# Patient Record
Sex: MALE | Race: AMERICAN INDIAN OR ALASKA NATIVE | URBAN - METROPOLITAN AREA
[De-identification: names, ages, dates, MRNs, and addresses within clinical notes are randomized per-mention and may not be internally consistent; named-entity substitution may affect disease eponyms.]

---

## 2022-12-16 ENCOUNTER — OFFICE VISIT (OUTPATIENT)
Dept: URBAN - METROPOLITAN AREA CLINIC 30 | Facility: CLINIC | Age: 37
End: 2022-12-16
Payer: COMMERCIAL

## 2022-12-16 DIAGNOSIS — H43.813 VITREOUS DEGENERATION, BILATERAL: ICD-10-CM

## 2022-12-16 DIAGNOSIS — H25.012 CORTICAL AGE-RELATED CATARACT, LEFT EYE: Primary | ICD-10-CM

## 2022-12-16 DIAGNOSIS — E11.9 TYPE 2 DIABETES MELLITUS W/O COMPLICATION: ICD-10-CM

## 2022-12-16 PROCEDURE — 92134 CPTRZ OPH DX IMG PST SGM RTA: CPT

## 2022-12-16 PROCEDURE — 99204 OFFICE O/P NEW MOD 45 MIN: CPT

## 2022-12-16 ASSESSMENT — KERATOMETRY
OD: 40.20
OS: 40.35

## 2022-12-16 ASSESSMENT — VISUAL ACUITY
OS: 20/80
OD: 20/20

## 2022-12-16 ASSESSMENT — INTRAOCULAR PRESSURE
OS: 19
OD: 21

## 2022-12-16 NOTE — IMPRESSION/PLAN
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
- Anterior cortical, denies trauma  Plan:  Discussed cataract diagnosis with the patient. Discussed and reviewed treatment options for cataracts. Patient elects surgical treatment. Recommend surgery OS. Patient is candidate/interested in multifocal, toric, standard, LenSx and ORA. Aim OS: plano. Patient advised of implications of lost myopia. Discussed patient will lose ability to read up close OS.

## 2022-12-16 NOTE — IMPRESSION/PLAN
Impression: Type 2 diabetes mellitus w/o complication: Z72.2. Plan: No diabetic retinopathy is noted. Patient is advised that a yearly ophthalmic exam is recommended. Return sooner if any new symptoms.

## 2023-01-12 ENCOUNTER — TESTING ONLY (OUTPATIENT)
Dept: URBAN - METROPOLITAN AREA CLINIC 24 | Facility: CLINIC | Age: 38
End: 2023-01-12
Payer: COMMERCIAL

## 2023-01-12 ENCOUNTER — ADULT PHYSICAL (OUTPATIENT)
Dept: URBAN - METROPOLITAN AREA CLINIC 24 | Facility: CLINIC | Age: 38
End: 2023-01-12

## 2023-01-12 DIAGNOSIS — Z01.818 ENCOUNTER FOR OTHER PREPROCEDURAL EXAMINATION: Primary | ICD-10-CM

## 2023-01-12 DIAGNOSIS — H25.012 CORTICAL AGE-RELATED CATARACT, LEFT EYE: ICD-10-CM

## 2023-01-12 PROCEDURE — 99202 OFFICE O/P NEW SF 15 MIN: CPT | Performed by: PHYSICIAN ASSISTANT

## 2023-01-12 ASSESSMENT — PACHYMETRY
OD: 3.29
OD: 25.78
OS: 25.74
OS: 3.59

## 2023-01-18 ENCOUNTER — PRE-OPERATIVE VISIT (OUTPATIENT)
Dept: URBAN - METROPOLITAN AREA CLINIC 24 | Facility: CLINIC | Age: 38
End: 2023-01-18
Payer: COMMERCIAL

## 2023-01-18 DIAGNOSIS — H43.813 VITREOUS DEGENERATION, BILATERAL: ICD-10-CM

## 2023-01-18 DIAGNOSIS — H52.203 BILATERAL ASTIGMATISM: ICD-10-CM

## 2023-01-18 DIAGNOSIS — H25.012 CORTICAL AGE-RELATED CATARACT, LEFT EYE: Primary | ICD-10-CM

## 2023-01-18 DIAGNOSIS — E11.9 TYPE 2 DIABETES MELLITUS W/O COMPLICATION: ICD-10-CM

## 2023-01-18 PROCEDURE — 99204 OFFICE O/P NEW MOD 45 MIN: CPT | Performed by: OPHTHALMOLOGY

## 2023-01-18 NOTE — IMPRESSION/PLAN
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 Plan: Discussed cataract diagnosis with the patient. Discussed risks, benefits and alternatives to surgery including but not limited to: bleeding, infection, risk of vision loss, loss of the eye, need for other surgery. Patient voiced understanding and wishes to proceed. Patient elects surgical treatment. Advanced technology options Discussed with patient . Patient desires surgery OS first (( AIM -0.25 OU, injectable  1st choice/TRI MOXI, Topical anesthesia, NO ORA, NO LRI-declined AMP, DENSE cortical OS, younger pt)) Patient understands the need for glasses after surgery for BCVA. Understands higher risk of complication and delayed healing due to dense cataract.

## 2023-01-18 NOTE — IMPRESSION/PLAN
Impression: Type 2 diabetes mellitus w/o complication: A71.2. Plan: Undilated exam. Diabetes type lI: no background retinopathy, no signs of neovascularization noted. Discussed ocular and systemic benefits of blood sugar control. Discussed with patient, understands this may limit vision after surgery. Discussed diet, exercise, nutrition. Good blood sugar and blood pressure control. Maintain follow up with PCP. Discussed with patient, understands this may limit vision after surgery.

## 2023-01-30 ENCOUNTER — SURGERY (OUTPATIENT)
Dept: URBAN - METROPOLITAN AREA SURGERY 12 | Facility: SURGERY | Age: 38
End: 2023-01-30
Payer: COMMERCIAL

## 2023-01-30 DIAGNOSIS — H25.012 CORTICAL AGE-RELATED CATARACT, LEFT EYE: Primary | ICD-10-CM

## 2023-01-30 PROCEDURE — 66984 XCAPSL CTRC RMVL W/O ECP: CPT | Performed by: OPHTHALMOLOGY

## 2023-01-31 ENCOUNTER — POST-OPERATIVE VISIT (OUTPATIENT)
Dept: URBAN - METROPOLITAN AREA CLINIC 30 | Facility: CLINIC | Age: 38
End: 2023-01-31
Payer: COMMERCIAL

## 2023-01-31 DIAGNOSIS — Z48.810 ENCOUNTER FOR SURGICAL AFTERCARE FOLLOWING SURGERY ON A SENSE ORGAN: Primary | ICD-10-CM

## 2023-01-31 PROCEDURE — 99024 POSTOP FOLLOW-UP VISIT: CPT

## 2023-01-31 NOTE — IMPRESSION/PLAN
Impression: S/P Cataract Extraction/IOL (BDP) OS - 1 Day. Encounter for surgical aftercare following surgery on a sense organ  Z48.810.  Plan: Doing excellent, fu 1 week

## 2023-02-06 ENCOUNTER — POST-OPERATIVE VISIT (OUTPATIENT)
Dept: URBAN - METROPOLITAN AREA CLINIC 30 | Facility: CLINIC | Age: 38
End: 2023-02-06
Payer: COMMERCIAL

## 2023-02-06 DIAGNOSIS — Z48.810 ENCOUNTER FOR SURGICAL AFTERCARE FOLLOWING SURGERY ON A SENSE ORGAN: Primary | ICD-10-CM

## 2023-02-06 PROCEDURE — 99024 POSTOP FOLLOW-UP VISIT: CPT

## 2023-02-06 ASSESSMENT — VISUAL ACUITY
OD: 20/20
OS: 20/20

## 2023-02-06 ASSESSMENT — KERATOMETRY
OD: 39.98
OS: 39.83

## 2023-02-06 ASSESSMENT — INTRAOCULAR PRESSURE
OD: 20
OS: 19

## 2023-02-06 NOTE — IMPRESSION/PLAN
Impression: S/P Cataract Extraction/IOL (BDP) OS - 7 Days. Encounter for surgical aftercare following surgery on a sense organ  Z48.810. Plan: Doing well.  Fu 1 mo